# Patient Record
Sex: FEMALE | Race: WHITE | NOT HISPANIC OR LATINO | Employment: STUDENT | URBAN - METROPOLITAN AREA
[De-identification: names, ages, dates, MRNs, and addresses within clinical notes are randomized per-mention and may not be internally consistent; named-entity substitution may affect disease eponyms.]

---

## 2018-11-08 ENCOUNTER — TELEPHONE (OUTPATIENT)
Dept: NEUROLOGY | Facility: CLINIC | Age: 23
End: 2018-11-08

## 2018-11-08 NOTE — TELEPHONE ENCOUNTER
----- Message from Marni Pizano sent at 11/8/2018  8:55 AM CST -----  Type:  Sooner Apoointment Request    Caller is requesting a sooner appointment.     Name of Caller:  Homero Espinoza   When is the first available appointment?  none  Symptoms:  headaches  Best Call Back Number:  [phone]  Additional Information:  Patient is school / please call mom to set up an appointment

## 2018-12-10 ENCOUNTER — LAB VISIT (OUTPATIENT)
Dept: LAB | Facility: HOSPITAL | Age: 23
End: 2018-12-10
Attending: PSYCHIATRY & NEUROLOGY
Payer: COMMERCIAL

## 2018-12-10 ENCOUNTER — OFFICE VISIT (OUTPATIENT)
Dept: NEUROLOGY | Facility: CLINIC | Age: 23
End: 2018-12-10
Payer: COMMERCIAL

## 2018-12-10 VITALS
HEIGHT: 69 IN | RESPIRATION RATE: 16 BRPM | SYSTOLIC BLOOD PRESSURE: 112 MMHG | WEIGHT: 149.69 LBS | DIASTOLIC BLOOD PRESSURE: 71 MMHG | BODY MASS INDEX: 22.17 KG/M2 | HEART RATE: 100 BPM

## 2018-12-10 DIAGNOSIS — R41.0 CONFUSION AND DISORIENTATION: ICD-10-CM

## 2018-12-10 DIAGNOSIS — R20.0 RIGHT ARM NUMBNESS: Primary | ICD-10-CM

## 2018-12-10 DIAGNOSIS — R51.9 ACUTE NONINTRACTABLE HEADACHE, UNSPECIFIED HEADACHE TYPE: ICD-10-CM

## 2018-12-10 DIAGNOSIS — R20.0 RIGHT ARM NUMBNESS: ICD-10-CM

## 2018-12-10 LAB
CRP SERPL-MCNC: 1.2 MG/L
HCYS SERPL-SCNC: 9.4 UMOL/L

## 2018-12-10 PROCEDURE — 85305 CLOT INHIBIT PROT S TOTAL: CPT

## 2018-12-10 PROCEDURE — 86147 CARDIOLIPIN ANTIBODY EA IG: CPT | Mod: 59

## 2018-12-10 PROCEDURE — 99999 PR PBB SHADOW E&M-EST. PATIENT-LVL III: CPT | Mod: PBBFAC,,, | Performed by: PSYCHIATRY & NEUROLOGY

## 2018-12-10 PROCEDURE — 85240 CLOT FACTOR VIII AHG 1 STAGE: CPT

## 2018-12-10 PROCEDURE — 85303 CLOT INHIBIT PROT C ACTIVITY: CPT

## 2018-12-10 PROCEDURE — 86146 BETA-2 GLYCOPROTEIN ANTIBODY: CPT

## 2018-12-10 PROCEDURE — 3008F BODY MASS INDEX DOCD: CPT | Mod: CPTII,S$GLB,, | Performed by: PSYCHIATRY & NEUROLOGY

## 2018-12-10 PROCEDURE — 83695 ASSAY OF LIPOPROTEIN(A): CPT

## 2018-12-10 PROCEDURE — 99204 OFFICE O/P NEW MOD 45 MIN: CPT | Mod: S$GLB,,, | Performed by: PSYCHIATRY & NEUROLOGY

## 2018-12-10 PROCEDURE — 81241 F5 GENE: CPT

## 2018-12-10 PROCEDURE — 86140 C-REACTIVE PROTEIN: CPT

## 2018-12-10 PROCEDURE — 81240 F2 GENE: CPT

## 2018-12-10 PROCEDURE — 85613 RUSSELL VIPER VENOM DILUTED: CPT

## 2018-12-10 PROCEDURE — 83021 HEMOGLOBIN CHROMOTOGRAPHY: CPT

## 2018-12-10 PROCEDURE — 85300 ANTITHROMBIN III ACTIVITY: CPT

## 2018-12-10 PROCEDURE — 83090 ASSAY OF HOMOCYSTEINE: CPT

## 2018-12-10 RX ORDER — FLUOXETINE 10 MG/1
CAPSULE ORAL
Refills: 3 | COMMUNITY
Start: 2018-11-29

## 2018-12-10 RX ORDER — NORETHINDRONE ACETATE AND ETHINYL ESTRADIOL AND FERROUS FUMARATE 1MG-20(21)
KIT ORAL
Refills: 8 | COMMUNITY
Start: 2018-10-11

## 2018-12-10 RX ORDER — LORATADINE 10 MG/1
10 TABLET ORAL
COMMUNITY
End: 2019-08-02

## 2018-12-10 NOTE — PROGRESS NOTES
Headache questionnaire    1. When did your Headaches start?    Nov 2018       2. Where are your headaches located?   Back of head (left side)      3. Your headache's characteristics:   Pressure, Throbbing, Pounding      4. How long does the headache last?   hours      5. How often does the headache occur?   only once      6. Are your headaches preceded or accompanied by other symptoms? yes   If yes, please describe.  Numbness in right arm, confusion, loss of vision      7. Does the headache awaken you at night? no   If so, how often? n/a        8. Please roger the word that best describes your headache's intensity:    moderate      9. Using a scale of 1 through 10, with 0 = no pain and 10 = the worst pain:   What score is your headache now? 0   What score is your headache at its worst? 7   What score is your headache at its best? 0        10. Possible associated headache symptoms:  []  Sensitivity to light  [x] Dizziness  [] Nasal or sinus pressure/ pain   [] Sensitivity to noise  [] Vertigo  [x] Problems with concentration  [] Sensitivity to smells  [] Ringing in ears  [x] Problems with memory    [x] Blurred vision  [] Irritability  [] Problems with task completion   [] Double vision  [] Anger  []  Problems with relaxation  [] Loss of appetite  [] Anxiety  [] Neck tightness, Neck pain  [] Nausea   [] Nasal congestion  [] Vomiting         11. Headache improving factors:  [] Sleep  [] Heat  [x] Darkness  [] Ice  [] Local pressure [] Menses (period)  [x] Massage   [] Medications:        12. Headache worsening factors:   [] Fatigue [] Sneezing  [] Changes in Weather  [x] Light [] Bending Over [] Stress  [x] Noise [] Ovulation  [] Multiple Sclerosis Flare-Up  [] Smells  [] Menses  [] Food   [] Coughing [] Alcohol      13. Number of caffeinated drinks per day: 3      14. Number of diet drinks per day:  2      15. Have you seen any other [facility]ists within the last 3 years?  No

## 2018-12-10 NOTE — PROGRESS NOTES
Subjective:       Patient ID: Kristan Laurent is a 23 y.o. female.    Chief Complaint: Headache and Numbness (in right arm)    HPI   The patient is a pleasant 24 y/o female without significant history of headache who developed acute onset of headache while in the library working on her computer on 11/7/2018. She comes accompanied by her mother who contributes to the history. The pain affected the left occipital region and was severe at the onset, felt as pressure and throbbing associated with disorientation, confusion, blurred vision and numbness in the right arm. Alarmed, she called her mother who advised to eat and drink which she did and felt better. The symptoms accompanying the headache lasted about three hours but the headache lingered until the next day. She had not had breakfast, she never eats breakfast. At the end of the day she was able to fall and maintain sleep.There was no nausea or vomiting, phonophobia, slight photophobia. Since the initial episode, she has had about one or two left occipital headaches, mild, lasting 15 to 30 minutes. Of notice, her mother had several miscarriages in the past. Otherwise, no family or personal history of blood clots or changes in OCP.  Please see details of headache characteristics below.    Headache questionnaire    1. When did your Headaches start?    Nov 2018       2. Where are your headaches located?   Back of head (left side)      3. Your headache's characteristics:   Pressure, Throbbing, Pounding      4. How long does the headache last?   hours      5. How often does the headache occur?   only once      6. Are your headaches preceded or accompanied by other symptoms? yes   If yes, please describe.  Numbness in right arm, confusion, loss of vision      7. Does the headache awaken you at night? no   If so, how often? n/a        8. Please roger the word that best describes your headache's intensity:    moderate      9. Using a scale of 1 through 10, with 0 = no pain  and 10 = the worst pain:   What score is your headache now? 0   What score is your headache at its worst? 7   What score is your headache at its best? 0        10. Possible associated headache symptoms:  []  Sensitivity to light  [x] Dizziness  [] Nasal or sinus pressure/ pain   [] Sensitivity to noise  [] Vertigo  [x] Problems with concentration  [] Sensitivity to smells  [] Ringing in ears  [x] Problems with memory    [x] Blurred vision  [] Irritability  [] Problems with task completion   [] Double vision  [] Anger  []  Problems with relaxation  [] Loss of appetite  [] Anxiety  [] Neck tightness, Neck pain  [] Nausea   [] Nasal congestion  [] Vomiting         11. Headache improving factors:  [] Sleep  [] Heat  [x] Darkness  [] Ice  [] Local pressure [] Menses (period)  [x] Massage   [] Medications:        12. Headache worsening factors:   [] Fatigue [] Sneezing  [] Changes in Weather  [x] Light [] Bending Over [] Stress  [x] Noise [] Ovulation  [] Multiple Sclerosis Flare-Up  [] Smells  [] Menses  [] Food   [] Coughing [] Alcohol      13. Number of caffeinated drinks per day: 3      14. Number of diet drinks per day:  2           Review of Systems   Constitutional: Negative for activity change, appetite change, fatigue and fever.   HENT: Negative for congestion, dental problem, hearing loss, sinus pressure, tinnitus, trouble swallowing and voice change.    Eyes: Negative for photophobia, pain, redness and visual disturbance.   Respiratory: Negative for cough, chest tightness and shortness of breath.    Cardiovascular: Negative for chest pain, palpitations and leg swelling.   Gastrointestinal: Negative for abdominal pain, blood in stool, nausea and vomiting.   Endocrine: Negative for cold intolerance and heat intolerance.   Genitourinary: Negative for difficulty urinating, frequency, menstrual problem and urgency.   Musculoskeletal: Negative for arthralgias, back pain, gait problem, joint swelling, myalgias, neck  pain and neck stiffness.   Skin: Negative.    Neurological: Positive for numbness (right arm, transient). Negative for dizziness, tremors, seizures, syncope, facial asymmetry, speech difficulty, weakness, light-headedness and headaches.   Hematological: Negative for adenopathy. Does not bruise/bleed easily.   Psychiatric/Behavioral: Negative for agitation, behavioral problems, confusion, decreased concentration, self-injury, sleep disturbance and suicidal ideas. The patient is not nervous/anxious and is not hyperactive.                History reviewed. No pertinent past medical history.  Past Surgical History:   Procedure Laterality Date    HERNIA REPAIR Right 2001     History reviewed. No pertinent family history.  Social History     Socioeconomic History    Marital status: Single     Spouse name: Not on file    Number of children: Not on file    Years of education: Not on file    Highest education level: Not on file   Social Needs    Financial resource strain: Not on file    Food insecurity - worry: Not on file    Food insecurity - inability: Not on file    Transportation needs - medical: Not on file    Transportation needs - non-medical: Not on file   Occupational History    Not on file   Tobacco Use    Smoking status: Never Smoker    Smokeless tobacco: Never Used   Substance and Sexual Activity    Alcohol use: No     Frequency: Never    Drug use: No    Sexual activity: No   Other Topics Concern    Not on file   Social History Narrative    Not on file     Review of patient's allergies indicates:  No Known Allergies    Current Outpatient Medications:     FLUoxetine 10 MG capsule, , Disp: , Rfl: 3    loratadine (CLARITIN) 10 mg tablet, Take 10 mg by mouth as needed for Allergies., Disp: , Rfl:     MICROGESTIN FE 1/20, 28, 1 mg-20 mcg (21)/75 mg (7) per tablet, , Disp: , Rfl: 8      Objective:      Vitals:    12/10/18 0957   BP: 112/71   Pulse: 100   Resp: 16     Body mass index is 22.11  kg/m².    Physical Exam    Constitutional:   She appears well-developed and well-nourished. She is well groomed    HENT:    Head: Atraumatic, oral and nasal mucosa intact  Eyes: Conjunctivae and EOM are normal. Pupils are equal, round, and reactive to light OU  Neck: Neck supple. No thyromegaly present  Cardiovascular: Normal rate and normal heart sounds  No murmur heard  Pulmonary/Chest: Effort normal and breath sounds normal  Musculoskeletal: Normal range of motion. No joint stiffness. No vertebral point tenderness  Skin: Skin is warm and dry  Psychiatric: Normal mood and affect     Neuro exam:    Mental status:  Awake, attentive, Alert, oriented to self, place, year and month  Language function is intact  Naming, repetition and spontaneous meaningful speech expression are intact  Speech fluency is good and speech is clear  Remote and recent memory are good  Patient able to count backwards by 7    No findings to suggest executive dysfuntion    Patient has adequate insight      Cranial Nerves:  Smell was not formally evaluated  Cranial Nerves II - XII: intact  Pursuits were smooth, normal saccades, no nystagmus OU  Funduscopic exam - disc were flat and pink, no exudates or hemorrhages OU  Motor - facial movement was symmetrical and normal     Palate moved well and was symmetrical with normal palatal and oral sensation  Tongue movements were full    Coordination:     Rapid alternating movements and rapid finger tapping - normal bilaterally  Finger to nose - normal and symmetric bilaterally   Arm roll - smooth and symmetric   No intentional or positional tremor.     Motor:  Normal muscle bulk and symmetry. No fasciculations were noted    No pronator drift  Strength 5/5 bilaterally     Reflexes:  Tendon reflexes were 2 + at biceps, triceps, brachioradialis, patellar, and Achilles bilaterally  On plantar stimulation toes were down going bilaterally  No clonus was noted     Sensory: Intact to light touch, pin prick in  all extremities.      Gait: Romberg absent. Normal gait. Good tandem          Assessment and Plan   The patient's symptoms are consistent with an atypical migraine aura, given the 3 hour duration of the right arm numbness, blurred vision and disorientation. Her mother has previous history of multiple miscarriages. A work up for hypercoagulability and PFO is indicated. No medications indicated at this time.   -     Transthoracic echo (TTE) complete (Cupid Only); Future  -     MRI Brain W WO Contrast; Future; Expected date: 12/10/2018  -     DRVVT; Future; Expected date: 12/10/2018  -     Cardiolipin antibody; Future; Expected date: 12/10/2018  -     Beta-2 glycoprotein antibodies; Future; Expected date: 12/10/2018  -     C-reactive protein; Future; Expected date: 12/10/2018  -     Lipoprotein A (LPA); Future; Expected date: 12/10/2018  -     Homocysteine, serum; Future; Expected date: 12/10/2018  -     Factor 8 assay; Future; Expected date: 12/10/2018  -     Protein C activity; Future; Expected date: 12/10/2018  -     Protein S activity; Future; Expected date: 12/10/2018  -     Antithrombin III; Future; Expected date: 12/10/2018  -     Factor 5 leiden; Future; Expected date: 12/10/2018  -     Prothrombin gene mutation; Future; Expected date: 12/10/2018  -     Hemoglobin Electrophoresis,Hgb A2 German.; Future; Expected date: 12/10/2018    Counseling:  More than 50% of the 45 minute encounter was spent face to face counseling the patient regarding work up and possible etiologies future plan of care. All questions were answered to patient's and her mother's satisfaction        Rose Nieto M.D  Medical Director, Headache and Facial Pain  [facility]

## 2018-12-11 ENCOUNTER — TELEPHONE (OUTPATIENT)
Dept: NEUROLOGY | Facility: CLINIC | Age: 23
End: 2018-12-11

## 2018-12-11 LAB — LA PPP-IMP: NEGATIVE

## 2018-12-12 LAB
F2 GENE MUT ANL BLD/T: NORMAL
F5 P.R506Q BLD/T QL: NORMAL
FACT VIII ACT/NOR PPP: 136 %
HGB A2 MFR BLD HPLC: 2.9 %
HGB FRACT BLD ELPH-IMP: NORMAL
HGB FRACT BLD ELPH-IMP: NORMAL

## 2018-12-13 LAB
APTT PROTEIN C ACTIVATOR+FV DP/APTT PPP: 110 %
AT III ACT/NOR PPP CHRO: 109 %
B2 GLYCOPROT1 IGA SER QL: <9 SAU
B2 GLYCOPROT1 IGG SER QL: <9 SGU
B2 GLYCOPROT1 IGM SER QL: <9 SMU

## 2018-12-14 LAB
CARDIOLIPIN IGG SER IA-ACNC: <9.4 GPL
CARDIOLIPIN IGM SER IA-ACNC: <9.4 MPL
LPA SERPL-MCNC: 8 MG/DL (ref 0–30)
PROT S ACT/NOR PPP: 87 %

## 2018-12-17 ENCOUNTER — CLINICAL SUPPORT (OUTPATIENT)
Dept: CARDIOLOGY | Facility: CLINIC | Age: 23
End: 2018-12-17
Attending: PSYCHIATRY & NEUROLOGY
Payer: COMMERCIAL

## 2018-12-17 ENCOUNTER — HOSPITAL ENCOUNTER (OUTPATIENT)
Dept: RADIOLOGY | Facility: HOSPITAL | Age: 23
Discharge: HOME | End: 2018-12-17
Attending: PSYCHIATRY & NEUROLOGY
Payer: COMMERCIAL

## 2018-12-17 VITALS
DIASTOLIC BLOOD PRESSURE: 70 MMHG | HEART RATE: 82 BPM | SYSTOLIC BLOOD PRESSURE: 118 MMHG | BODY MASS INDEX: 22.07 KG/M2 | HEIGHT: 69 IN | WEIGHT: 149 LBS

## 2018-12-17 DIAGNOSIS — R20.0 RIGHT ARM NUMBNESS: ICD-10-CM

## 2018-12-17 DIAGNOSIS — R41.0 CONFUSION AND DISORIENTATION: ICD-10-CM

## 2018-12-17 PROCEDURE — 70553 MRI BRAIN STEM W/O & W/DYE: CPT | Mod: TC,PO

## 2018-12-17 PROCEDURE — 99999 PR PBB SHADOW E&M-EST. PATIENT-LVL II: CPT | Mod: PBBFAC,,,

## 2018-12-17 PROCEDURE — 93306 TTE W/DOPPLER COMPLETE: CPT | Mod: S$GLB,,, | Performed by: INTERNAL MEDICINE

## 2018-12-17 PROCEDURE — A9585 GADOBUTROL INJECTION: HCPCS | Mod: PO | Performed by: PSYCHIATRY & NEUROLOGY

## 2018-12-17 PROCEDURE — 70553 MRI BRAIN STEM W/O & W/DYE: CPT | Mod: 26,,, | Performed by: RADIOLOGY

## 2018-12-17 PROCEDURE — 25500020 PHARM REV CODE 255: Mod: PO | Performed by: PSYCHIATRY & NEUROLOGY

## 2018-12-17 RX ORDER — GADOBUTROL 604.72 MG/ML
6 INJECTION INTRAVENOUS
Status: COMPLETED | OUTPATIENT
Start: 2018-12-17 | End: 2018-12-17

## 2018-12-17 RX ADMIN — GADOBUTROL 6 ML: 604.72 INJECTION INTRAVENOUS at 12:12

## 2018-12-18 LAB
ASCENDING AORTA: 2.03 CM
AV INDEX (PROSTH): 0.86
AV MEAN GRADIENT: 2.24 MMHG
AV PEAK GRADIENT: 4.24 MMHG
AV VALVE AREA: 2.78 CM2
BSA FOR ECHO PROCEDURE: 1.81 M2
CV ECHO LV RWT: 0.37 CM
DOP CALC AO PEAK VEL: 1.03 M/S
DOP CALC AO VTI: 22.43 CM
DOP CALC LVOT AREA: 3.23 CM2
DOP CALC LVOT DIAMETER: 2.03 CM
DOP CALC LVOT STROKE VOLUME: 62.27 CM3
DOP CALCLVOT PEAK VEL VTI: 19.25 CM
E WAVE DECELERATION TIME: 246.46 MSEC
E/A RATIO: 1.6
E/E' RATIO: 6.07
ECHO LV POSTERIOR WALL: 0.79 CM (ref 0.6–1.1)
FRACTIONAL SHORTENING: 42 % (ref 28–44)
INTERVENTRICULAR SEPTUM: 0.83 CM (ref 0.6–1.1)
IVRT: 0.09 MSEC
LA MAJOR: 4.22 CM
LA MINOR: 4.1 CM
LA WIDTH: 2.2 CM
LEFT ATRIUM SIZE: 2.54 CM
LEFT ATRIUM VOLUME INDEX: 10.8 ML/M2
LEFT ATRIUM VOLUME: 19.76 CM3
LEFT INTERNAL DIMENSION IN SYSTOLE: 2.49 CM (ref 2.1–4)
LEFT VENTRICLE DIASTOLIC VOLUME INDEX: 45.87 ML/M2
LEFT VENTRICLE DIASTOLIC VOLUME: 83.61 ML
LEFT VENTRICLE MASS INDEX: 59 G/M2
LEFT VENTRICLE SYSTOLIC VOLUME INDEX: 12.1 ML/M2
LEFT VENTRICLE SYSTOLIC VOLUME: 22.1 ML
LEFT VENTRICULAR INTERNAL DIMENSION IN DIASTOLE: 4.31 CM (ref 3.5–6)
LEFT VENTRICULAR MASS: 107.48 G
LV LATERAL E/E' RATIO: 6.5
LV SEPTAL E/E' RATIO: 5.69
MV PEAK A VEL: 0.57 M/S
MV PEAK E VEL: 0.91 M/S
PISA TR MAX VEL: 2.24 M/S
PULM VEIN S/D RATIO: 1.09
PV PEAK D VEL: 0.65 M/S
PV PEAK S VEL: 0.71 M/S
RA MAJOR: 3.59 CM
RA PRESSURE: 3 MMHG
RA WIDTH: 3.1 CM
RIGHT VENTRICULAR END-DIASTOLIC DIMENSION: 2.54 CM
RV TISSUE DOPPLER FREE WALL SYSTOLIC VELOCITY 1 (APICAL 4 CHAMBER VIEW): 9.98 M/S
SINUS: 2.5 CM
STJ: 2.39 CM
TDI LATERAL: 0.14
TDI SEPTAL: 0.16
TDI: 0.15
TR MAX PG: 20.07 MMHG
TRICUSPID ANNULAR PLANE SYSTOLIC EXCURSION: 2.64 CM
TV REST PULMONARY ARTERY PRESSURE: 23.07 MMHG

## 2018-12-26 ENCOUNTER — TELEPHONE (OUTPATIENT)
Dept: NEUROLOGY | Facility: CLINIC | Age: 23
End: 2018-12-26

## 2018-12-26 NOTE — TELEPHONE ENCOUNTER
----- Message from Adelaide Mancuso sent at 12/26/2018 12:59 PM CST -----  Contact: Kristan  Type: Needs Medical Advice    Who Called:  patient  Best Call Back Number: [phone]  Additional Information: requesting clearance to continue birth control--Dr. Ellie Ramos requested the clearance--please advise--thank you

## 2018-12-27 ENCOUNTER — PATIENT MESSAGE (OUTPATIENT)
Dept: NEUROLOGY | Facility: CLINIC | Age: 23
End: 2018-12-27